# Patient Record
Sex: FEMALE | Race: WHITE
[De-identification: names, ages, dates, MRNs, and addresses within clinical notes are randomized per-mention and may not be internally consistent; named-entity substitution may affect disease eponyms.]

---

## 2022-07-20 ENCOUNTER — HOSPITAL ENCOUNTER (OUTPATIENT)
Dept: HOSPITAL 19 - LDRO | Age: 27
Discharge: HOME | End: 2022-07-20
Attending: OBSTETRICS & GYNECOLOGY
Payer: COMMERCIAL

## 2022-07-20 VITALS — DIASTOLIC BLOOD PRESSURE: 64 MMHG | HEART RATE: 102 BPM | SYSTOLIC BLOOD PRESSURE: 116 MMHG | TEMPERATURE: 99 F

## 2022-07-20 VITALS — SYSTOLIC BLOOD PRESSURE: 113 MMHG | HEART RATE: 99 BPM | DIASTOLIC BLOOD PRESSURE: 66 MMHG

## 2022-07-20 VITALS — DIASTOLIC BLOOD PRESSURE: 51 MMHG | TEMPERATURE: 98.6 F | SYSTOLIC BLOOD PRESSURE: 90 MMHG | HEART RATE: 88 BPM

## 2022-07-20 VITALS — WEIGHT: 216.49 LBS | HEIGHT: 62.01 IN | BODY MASS INDEX: 39.34 KG/M2

## 2022-07-20 DIAGNOSIS — Z3A.36: ICD-10-CM

## 2022-07-20 NOTE — NUR
1220-G1 36.[address] Dillard ambulatory to LDR 4 with complaint of
cramping in perineum. Rhianna damico is complicated by shortened cervix,
she has a cerclage in place. Dr. Dillard notified. Orders to place patient on
EFM and update with contraction activity.
1305-Dr. Dillard updated on strip. Orders recieved to discharge patient.
1330-Patient ambulatory off unit.

## 2022-07-24 ENCOUNTER — HOSPITAL ENCOUNTER (OUTPATIENT)
Dept: HOSPITAL 19 - LDRO | Age: 27
Discharge: HOME | End: 2022-07-24
Attending: OBSTETRICS & GYNECOLOGY
Payer: COMMERCIAL

## 2022-07-24 VITALS — SYSTOLIC BLOOD PRESSURE: 92 MMHG | DIASTOLIC BLOOD PRESSURE: 50 MMHG | HEART RATE: 80 BPM

## 2022-07-24 VITALS — DIASTOLIC BLOOD PRESSURE: 69 MMHG | SYSTOLIC BLOOD PRESSURE: 104 MMHG | HEART RATE: 85 BPM

## 2022-07-24 VITALS — HEART RATE: 86 BPM | DIASTOLIC BLOOD PRESSURE: 63 MMHG | SYSTOLIC BLOOD PRESSURE: 102 MMHG

## 2022-07-24 VITALS — DIASTOLIC BLOOD PRESSURE: 53 MMHG | SYSTOLIC BLOOD PRESSURE: 90 MMHG | HEART RATE: 88 BPM

## 2022-07-24 VITALS — DIASTOLIC BLOOD PRESSURE: 57 MMHG | HEART RATE: 83 BPM | SYSTOLIC BLOOD PRESSURE: 107 MMHG

## 2022-07-24 VITALS — DIASTOLIC BLOOD PRESSURE: 51 MMHG | HEART RATE: 83 BPM | SYSTOLIC BLOOD PRESSURE: 91 MMHG | TEMPERATURE: 98 F

## 2022-07-24 VITALS — HEIGHT: 62.01 IN | BODY MASS INDEX: 39.34 KG/M2 | WEIGHT: 216.49 LBS

## 2022-07-24 VITALS — DIASTOLIC BLOOD PRESSURE: 68 MMHG | TEMPERATURE: 98.1 F | HEART RATE: 103 BPM | SYSTOLIC BLOOD PRESSURE: 106 MMHG

## 2022-07-24 DIAGNOSIS — O26.893: Primary | ICD-10-CM

## 2022-07-24 DIAGNOSIS — Z3A.36: ICD-10-CM

## 2022-07-24 DIAGNOSIS — Z90.711: ICD-10-CM

## 2022-07-24 NOTE — NUR
1027- to patient room. Discusses plan for cerlage removal with
patient. Set up for procedure. Patient placed in stirps.
1044-Dr. Dillard confirms cerclage removed. Cervical exam by MD 4-5/70/-2.
Orders to monitor patient for an hour. Patient repositioned and updated on
plan of care. Home

## 2022-07-24 NOTE — NUR
1130-Patient reports contractions intermittently and overall "crampy." States
"some feel worse than before cerclage removal." Repositioned LL and encouarged
oral hydration. Educated patienit on active labor pattern versus
irritability/irregular contractions. Patient verbalizes understanding. Denies
current needs. Will continue to monitor at this time.

## 2022-07-24 NOTE — NUR
1200-Patient reports "I have felt pretty good over the last 15-20 min. Denies
contractions at this time. Updated on Dr. Dillard orders for discharge. Patient
Agreeable with plan for discharge and denies questions.
1204-Paitent off EFM to change, Reviewed discharge instructions and follow up
appt. with patient.
1216-Patient ambualtory off unit with spouse.

## 2022-07-24 NOTE — NUR
1000-36.4 WEEK G1 patient of Dr. Dillard ambulatory to LDR 5 for scheduled
removal of cervical cerclage. Foerigntenfredy reports good FM, Denies LOF or vaginal
bleeding. Reports some intermittent contractions  and cramping. Assisted into
gown, placed on EFM. Assessment complete. VSS. Consent for procedure reviewed
and signed.

## 2022-08-05 ENCOUNTER — HOSPITAL ENCOUNTER (INPATIENT)
Dept: HOSPITAL 19 - LDRO | Age: 27
LOS: 2 days | Discharge: HOME | End: 2022-08-07
Attending: OBSTETRICS & GYNECOLOGY | Admitting: OBSTETRICS & GYNECOLOGY
Payer: COMMERCIAL

## 2022-08-05 VITALS — HEART RATE: 78 BPM | TEMPERATURE: 98.3 F | DIASTOLIC BLOOD PRESSURE: 60 MMHG | SYSTOLIC BLOOD PRESSURE: 106 MMHG

## 2022-08-05 VITALS — WEIGHT: 215.39 LBS | BODY MASS INDEX: 39.14 KG/M2 | HEIGHT: 62.01 IN

## 2022-08-05 DIAGNOSIS — O34.33: ICD-10-CM

## 2022-08-05 DIAGNOSIS — F32.A: ICD-10-CM

## 2022-08-05 DIAGNOSIS — F41.9: ICD-10-CM

## 2022-08-05 DIAGNOSIS — Z3A.38: ICD-10-CM

## 2022-08-05 DIAGNOSIS — O34.593: ICD-10-CM

## 2022-08-05 DIAGNOSIS — Z86.16: ICD-10-CM

## 2022-08-05 NOTE — NUR
PT TO UNIT AMBULATORY WITH SPOUSE WITH COMPLAINTS OF VAGINAL BLEEDING AND
IRREGULAR CTX. PT STATES SHE HAS HAD IRREGULAR CTX SINCE YESTERDAY BUT CAME IN
DUE TO NOTICING BRIGHT BLOOD ON THE TOILET PAPER WHEN USING THE BATHROOM. PT
ORIENTED TO ROOM, CHANGED INTO GOWN. EFM X2 APPLIED, VS OBTAINED, SVE
PERFORMED.

## 2022-08-06 VITALS — DIASTOLIC BLOOD PRESSURE: 75 MMHG | HEART RATE: 82 BPM | SYSTOLIC BLOOD PRESSURE: 114 MMHG

## 2022-08-06 VITALS — SYSTOLIC BLOOD PRESSURE: 95 MMHG | HEART RATE: 70 BPM | DIASTOLIC BLOOD PRESSURE: 54 MMHG

## 2022-08-06 VITALS — SYSTOLIC BLOOD PRESSURE: 116 MMHG | HEART RATE: 80 BPM | DIASTOLIC BLOOD PRESSURE: 72 MMHG

## 2022-08-06 VITALS — HEART RATE: 81 BPM | DIASTOLIC BLOOD PRESSURE: 71 MMHG | SYSTOLIC BLOOD PRESSURE: 116 MMHG

## 2022-08-06 VITALS — DIASTOLIC BLOOD PRESSURE: 69 MMHG | SYSTOLIC BLOOD PRESSURE: 114 MMHG | HEART RATE: 76 BPM

## 2022-08-06 VITALS — DIASTOLIC BLOOD PRESSURE: 50 MMHG | SYSTOLIC BLOOD PRESSURE: 86 MMHG | HEART RATE: 72 BPM

## 2022-08-06 VITALS — HEART RATE: 69 BPM | SYSTOLIC BLOOD PRESSURE: 104 MMHG | DIASTOLIC BLOOD PRESSURE: 66 MMHG

## 2022-08-06 VITALS — SYSTOLIC BLOOD PRESSURE: 106 MMHG | DIASTOLIC BLOOD PRESSURE: 65 MMHG | HEART RATE: 71 BPM

## 2022-08-06 VITALS — DIASTOLIC BLOOD PRESSURE: 72 MMHG | SYSTOLIC BLOOD PRESSURE: 110 MMHG | HEART RATE: 90 BPM

## 2022-08-06 VITALS — DIASTOLIC BLOOD PRESSURE: 68 MMHG | SYSTOLIC BLOOD PRESSURE: 112 MMHG | HEART RATE: 77 BPM

## 2022-08-06 VITALS — DIASTOLIC BLOOD PRESSURE: 52 MMHG | SYSTOLIC BLOOD PRESSURE: 91 MMHG | HEART RATE: 72 BPM

## 2022-08-06 VITALS — DIASTOLIC BLOOD PRESSURE: 73 MMHG | SYSTOLIC BLOOD PRESSURE: 111 MMHG | HEART RATE: 71 BPM

## 2022-08-06 VITALS — SYSTOLIC BLOOD PRESSURE: 90 MMHG | DIASTOLIC BLOOD PRESSURE: 55 MMHG | HEART RATE: 70 BPM

## 2022-08-06 VITALS — DIASTOLIC BLOOD PRESSURE: 73 MMHG | HEART RATE: 93 BPM | SYSTOLIC BLOOD PRESSURE: 122 MMHG

## 2022-08-06 VITALS — HEART RATE: 77 BPM | SYSTOLIC BLOOD PRESSURE: 113 MMHG | DIASTOLIC BLOOD PRESSURE: 78 MMHG

## 2022-08-06 VITALS — SYSTOLIC BLOOD PRESSURE: 96 MMHG | HEART RATE: 76 BPM | DIASTOLIC BLOOD PRESSURE: 56 MMHG

## 2022-08-06 VITALS — DIASTOLIC BLOOD PRESSURE: 59 MMHG | TEMPERATURE: 97.8 F | HEART RATE: 77 BPM | SYSTOLIC BLOOD PRESSURE: 106 MMHG

## 2022-08-06 VITALS — DIASTOLIC BLOOD PRESSURE: 66 MMHG | HEART RATE: 85 BPM | SYSTOLIC BLOOD PRESSURE: 107 MMHG

## 2022-08-06 VITALS — DIASTOLIC BLOOD PRESSURE: 72 MMHG | HEART RATE: 72 BPM | SYSTOLIC BLOOD PRESSURE: 110 MMHG

## 2022-08-06 VITALS — DIASTOLIC BLOOD PRESSURE: 68 MMHG | SYSTOLIC BLOOD PRESSURE: 107 MMHG | HEART RATE: 68 BPM

## 2022-08-06 VITALS — SYSTOLIC BLOOD PRESSURE: 112 MMHG | HEART RATE: 88 BPM | DIASTOLIC BLOOD PRESSURE: 56 MMHG

## 2022-08-06 VITALS — DIASTOLIC BLOOD PRESSURE: 65 MMHG | SYSTOLIC BLOOD PRESSURE: 104 MMHG | HEART RATE: 69 BPM

## 2022-08-06 VITALS — DIASTOLIC BLOOD PRESSURE: 50 MMHG | HEART RATE: 68 BPM | SYSTOLIC BLOOD PRESSURE: 90 MMHG

## 2022-08-06 VITALS — DIASTOLIC BLOOD PRESSURE: 72 MMHG | SYSTOLIC BLOOD PRESSURE: 112 MMHG | TEMPERATURE: 99 F | HEART RATE: 80 BPM

## 2022-08-06 VITALS — DIASTOLIC BLOOD PRESSURE: 72 MMHG | TEMPERATURE: 99.3 F | SYSTOLIC BLOOD PRESSURE: 113 MMHG | HEART RATE: 75 BPM

## 2022-08-06 VITALS — HEART RATE: 74 BPM | DIASTOLIC BLOOD PRESSURE: 64 MMHG | TEMPERATURE: 98.9 F | SYSTOLIC BLOOD PRESSURE: 104 MMHG

## 2022-08-06 VITALS — HEART RATE: 73 BPM | DIASTOLIC BLOOD PRESSURE: 70 MMHG | SYSTOLIC BLOOD PRESSURE: 109 MMHG

## 2022-08-06 VITALS — DIASTOLIC BLOOD PRESSURE: 72 MMHG | TEMPERATURE: 98.5 F | HEART RATE: 71 BPM | SYSTOLIC BLOOD PRESSURE: 111 MMHG

## 2022-08-06 VITALS — HEART RATE: 80 BPM

## 2022-08-06 VITALS — DIASTOLIC BLOOD PRESSURE: 60 MMHG | HEART RATE: 88 BPM | SYSTOLIC BLOOD PRESSURE: 110 MMHG

## 2022-08-06 VITALS — HEART RATE: 78 BPM | SYSTOLIC BLOOD PRESSURE: 100 MMHG | TEMPERATURE: 98.3 F | DIASTOLIC BLOOD PRESSURE: 61 MMHG

## 2022-08-06 VITALS — DIASTOLIC BLOOD PRESSURE: 59 MMHG | SYSTOLIC BLOOD PRESSURE: 100 MMHG | HEART RATE: 82 BPM

## 2022-08-06 VITALS — SYSTOLIC BLOOD PRESSURE: 89 MMHG | HEART RATE: 75 BPM | DIASTOLIC BLOOD PRESSURE: 51 MMHG

## 2022-08-06 VITALS — HEART RATE: 68 BPM | DIASTOLIC BLOOD PRESSURE: 66 MMHG | SYSTOLIC BLOOD PRESSURE: 103 MMHG

## 2022-08-06 VITALS — DIASTOLIC BLOOD PRESSURE: 57 MMHG | SYSTOLIC BLOOD PRESSURE: 106 MMHG | HEART RATE: 87 BPM

## 2022-08-06 VITALS — DIASTOLIC BLOOD PRESSURE: 51 MMHG | HEART RATE: 74 BPM | SYSTOLIC BLOOD PRESSURE: 90 MMHG

## 2022-08-06 VITALS — SYSTOLIC BLOOD PRESSURE: 91 MMHG | HEART RATE: 75 BPM | DIASTOLIC BLOOD PRESSURE: 51 MMHG

## 2022-08-06 VITALS — DIASTOLIC BLOOD PRESSURE: 74 MMHG | HEART RATE: 80 BPM | SYSTOLIC BLOOD PRESSURE: 112 MMHG

## 2022-08-06 VITALS — SYSTOLIC BLOOD PRESSURE: 103 MMHG | HEART RATE: 72 BPM | DIASTOLIC BLOOD PRESSURE: 71 MMHG

## 2022-08-06 VITALS — DIASTOLIC BLOOD PRESSURE: 67 MMHG | HEART RATE: 72 BPM | SYSTOLIC BLOOD PRESSURE: 107 MMHG

## 2022-08-06 VITALS — DIASTOLIC BLOOD PRESSURE: 51 MMHG | SYSTOLIC BLOOD PRESSURE: 93 MMHG | HEART RATE: 84 BPM

## 2022-08-06 VITALS — HEART RATE: 71 BPM | DIASTOLIC BLOOD PRESSURE: 67 MMHG | SYSTOLIC BLOOD PRESSURE: 107 MMHG

## 2022-08-06 VITALS — DIASTOLIC BLOOD PRESSURE: 54 MMHG | HEART RATE: 87 BPM | SYSTOLIC BLOOD PRESSURE: 93 MMHG

## 2022-08-06 VITALS — SYSTOLIC BLOOD PRESSURE: 109 MMHG | DIASTOLIC BLOOD PRESSURE: 67 MMHG | HEART RATE: 71 BPM

## 2022-08-06 VITALS — DIASTOLIC BLOOD PRESSURE: 64 MMHG | HEART RATE: 74 BPM | SYSTOLIC BLOOD PRESSURE: 101 MMHG

## 2022-08-06 VITALS — HEART RATE: 75 BPM | DIASTOLIC BLOOD PRESSURE: 69 MMHG | SYSTOLIC BLOOD PRESSURE: 99 MMHG

## 2022-08-06 VITALS — HEART RATE: 73 BPM | TEMPERATURE: 98.8 F

## 2022-08-06 VITALS — DIASTOLIC BLOOD PRESSURE: 61 MMHG | SYSTOLIC BLOOD PRESSURE: 102 MMHG | HEART RATE: 72 BPM

## 2022-08-06 VITALS — HEART RATE: 93 BPM | DIASTOLIC BLOOD PRESSURE: 57 MMHG | SYSTOLIC BLOOD PRESSURE: 119 MMHG | TEMPERATURE: 98.7 F

## 2022-08-06 VITALS — HEART RATE: 72 BPM | SYSTOLIC BLOOD PRESSURE: 106 MMHG | DIASTOLIC BLOOD PRESSURE: 67 MMHG

## 2022-08-06 VITALS — HEART RATE: 80 BPM | DIASTOLIC BLOOD PRESSURE: 66 MMHG | SYSTOLIC BLOOD PRESSURE: 96 MMHG

## 2022-08-06 VITALS — SYSTOLIC BLOOD PRESSURE: 92 MMHG | HEART RATE: 68 BPM | DIASTOLIC BLOOD PRESSURE: 64 MMHG

## 2022-08-06 VITALS — SYSTOLIC BLOOD PRESSURE: 114 MMHG | HEART RATE: 73 BPM | DIASTOLIC BLOOD PRESSURE: 59 MMHG

## 2022-08-06 VITALS — HEART RATE: 79 BPM | SYSTOLIC BLOOD PRESSURE: 98 MMHG | DIASTOLIC BLOOD PRESSURE: 62 MMHG

## 2022-08-06 VITALS — SYSTOLIC BLOOD PRESSURE: 119 MMHG | DIASTOLIC BLOOD PRESSURE: 74 MMHG | HEART RATE: 83 BPM

## 2022-08-06 VITALS — SYSTOLIC BLOOD PRESSURE: 95 MMHG | HEART RATE: 67 BPM | DIASTOLIC BLOOD PRESSURE: 50 MMHG

## 2022-08-06 LAB
BASOPHILS # BLD: 0 K/MM3 (ref 0–0.2)
BASOPHILS NFR BLD AUTO: 0.3 % (ref 0–2)
EOSINOPHIL # BLD: 0.1 K/MM3 (ref 0–0.7)
EOSINOPHIL NFR BLD: 0.8 % (ref 0–4)
ERYTHROCYTE [DISTWIDTH] IN BLOOD BY AUTOMATED COUNT: 15.5 % (ref 11.5–14.5)
GRANULOCYTES # BLD AUTO: 67 % (ref 42.2–75.2)
HCT VFR BLD AUTO: 35.3 % (ref 37–47)
HGB BLD-MCNC: 11.3 G/DL (ref 12.5–16)
LYMPHOCYTES # BLD: 2.5 K/MM3 (ref 1.2–3.4)
LYMPHOCYTES NFR BLD: 24.8 % (ref 20–51)
MCH RBC QN AUTO: 30 PG (ref 27–31)
MCHC RBC AUTO-ENTMCNC: 32 G/DL (ref 33–37)
MCV RBC AUTO: 93 FL (ref 80–100)
MONOCYTES # BLD: 0.6 K/MM3 (ref 0.1–0.6)
MONOCYTES NFR BLD AUTO: 6.1 % (ref 1.7–9.3)
NEUTROPHILS # BLD: 6.8 K/MM3 (ref 1.4–6.5)
PLATELET # BLD AUTO: 219 K/MM3 (ref 130–400)
PMV BLD AUTO: 13 FL (ref 7.4–10.4)
RBC # BLD AUTO: 3.81 M/MM3 (ref 4.1–5.3)

## 2022-08-06 PROCEDURE — 10D17Z9 MANUAL EXTRACTION OF PRODUCTS OF CONCEPTION, RETAINED, VIA NATURAL OR ARTIFICIAL OPENING: ICD-10-PCS | Performed by: OBSTETRICS & GYNECOLOGY

## 2022-08-06 PROCEDURE — 0UQG7ZZ REPAIR VAGINA, VIA NATURAL OR ARTIFICIAL OPENING: ICD-10-PCS | Performed by: OBSTETRICS & GYNECOLOGY

## 2022-08-06 NOTE — NUR
0630RN to bedside. Assessment completed. Plan of care reviewed with patient
and spouse who verbalize understanding. Discussed position changes to
promote fetal decent/rotation. Pt agrees.
 
0655Side lying hip release right side.
 
0705Side lying hip release left side.
 
0715SVE 8/75/-2, KLARISSA. Pt left laterl with right leg stirrup. Pt resting
comfortabley with call light within reach.
 
0720Phone call received from Dr. Bucio. See physician notification.

## 2022-08-06 NOTE — NUR
1250 Dr. Bucio to bedside. SVE per provider C/+2. Pt instructed on pushing with
contractions.
 
1253 Catheter removed. Samara care provided. Pt repositioned in stirrups.
 
1256Begins to push with contractions with RN at bedside. Strong maternal
effort noted. Moves vertex well.
 
1315FHR 105bmp. Pt wedge left.
 
1320FHR baseline 105bmp with minimal variability. Dr. Bucio at nurses desk and
notified of FHR baseline and variability. Dr. Bucio reviews EFM strips. RN
returns to bedside and continue to push with pt.
 
1340Dr. Bucio requested at bedside for delivery.
 
1345Spontaneous vaginal delivery of viable female infant. To mother's chest
where dried and stimulated by nursery RN. Cord clamped x2 and cut by Dr. Bucio. Infant to radiant warmer and care assumed by ADELINE eRyes RN. Cord
gases obtained by Dr. Bucio.

1350 Delivery of placenta. Trailing membranes noted. MEU by Dr. Bucio. Pitocin
to 333ml/hr per orders. Fundus firm, midline, and bleeding minimal. Left
vaginal sidewall laceration repaired by Dr. Bucio.
 
1407Straight cath by Dr. Bucio. Samara care provided, pads changed, and ice pack
to perineum. Plan of care and safety precautions reviewed. See doctor
dictation, anesthesia record, and nurses notes.

## 2022-08-06 NOTE — NUR
1215Recurrent late decels. Pt high right lateral. LR bolus initiated.
 
1230Recurrent late decels continue. Pt shayy fowlers wedge left.

## 2022-08-06 NOTE — NUR
0920Patient right with peanut ball. EFM adjusted.
 
0922Dr. Rupinder on unit. Reviews FHR strips.
 
0925Dr. Rupinder to patient bedside. Discusses plan of care with patient and
spouse.
 
0930AROM by Dr. Bucio for large amount of clear amniotic fluid. SVE per
provider 8/90/-2. Samara care provided, pads changed, and pt wedge right
with peanut ball. Resting with call light within reach.

## 2022-08-06 NOTE — NUR
1010Patient calls RN to room and reports sharp lower abdominal pain. Pt
coached through breathing. Pt attempts pt controlled epidural bolus
button. Pump not dosing. NARDA Valentin CRNA notified and to bedside. Pt high
fowlers wedge right.
 
1013Epidural PCA dose given by NARDA Valentin CRNA. Plan of care reviewed. Pt
verbalizes understanding.

## 2022-08-06 NOTE — NUR
0335- NARDA GRANDE CRNA IN ROOM FOR EPIDURAL PLACEMENT.
0336- PT SITTING UP AT BEDSIDE FOR EPIDURAL. DIFFICULTY TRACING FHTs DUE TO
MATERNAL POSITION, FM AUDIBLE ON US.
0347- TEST DOSE GIVEN BY NARDA GRANDE CRNA, PT TOLERATED WELL.
0355- PT REPOSITIONED INTO SEMIFOWLERS.

## 2022-08-06 NOTE — NUR
1200Recurrent early decels with occasional variable and late decel. Pt right
lateral. LR bolus given.

## 2022-08-07 VITALS — TEMPERATURE: 98 F | SYSTOLIC BLOOD PRESSURE: 99 MMHG | HEART RATE: 77 BPM | DIASTOLIC BLOOD PRESSURE: 55 MMHG

## 2022-08-07 VITALS — SYSTOLIC BLOOD PRESSURE: 107 MMHG | DIASTOLIC BLOOD PRESSURE: 66 MMHG | HEART RATE: 77 BPM

## 2022-08-07 NOTE — NUR
Discharge instructions and follow up care reviewed with pt and  at the
bedside. Both verbalized an understanding, agreed with the plan and states no
questions or concerns at this time.